# Patient Record
Sex: FEMALE | URBAN - NONMETROPOLITAN AREA
[De-identification: names, ages, dates, MRNs, and addresses within clinical notes are randomized per-mention and may not be internally consistent; named-entity substitution may affect disease eponyms.]

---

## 2021-12-30 ENCOUNTER — APPOINTMENT (RX ONLY)
Dept: URBAN - NONMETROPOLITAN AREA CLINIC 25 | Facility: CLINIC | Age: 26
Setting detail: DERMATOLOGY
End: 2021-12-30

## 2021-12-30 DIAGNOSIS — Z41.9 ENCOUNTER FOR PROCEDURE FOR PURPOSES OTHER THAN REMEDYING HEALTH STATE, UNSPECIFIED: ICD-10-CM

## 2021-12-30 PROCEDURE — ? PATIENT SPECIFIC COUNSELING

## 2021-12-30 ASSESSMENT — LOCATION DETAILED DESCRIPTION DERM: LOCATION DETAILED: LEFT INFERIOR CENTRAL MALAR CHEEK

## 2021-12-30 ASSESSMENT — LOCATION ZONE DERM: LOCATION ZONE: FACE

## 2021-12-30 ASSESSMENT — LOCATION SIMPLE DESCRIPTION DERM: LOCATION SIMPLE: LEFT CHEEK

## 2021-12-30 NOTE — PROCEDURE: PATIENT SPECIFIC COUNSELING
Discussed in detail different avenues to help with acne scarring. Recommended using mineral based sunscreen, retinol and vitamin C (topically). Recommended doing 3 sessions of microneedling for optimal results. Aware she needs to wait at least 4 weeks between sessions. Provided samples of Avene retinol, and Epionce lytic cleanser. Discussed in detail importance of keeping acne under control if she decides to get treatments.
Detail Level: Zone

## 2022-05-18 ENCOUNTER — APPOINTMENT (RX ONLY)
Dept: URBAN - NONMETROPOLITAN AREA CLINIC 25 | Facility: CLINIC | Age: 27
Setting detail: DERMATOLOGY
End: 2022-05-18

## 2022-05-18 DIAGNOSIS — Z41.9 ENCOUNTER FOR PROCEDURE FOR PURPOSES OTHER THAN REMEDYING HEALTH STATE, UNSPECIFIED: ICD-10-CM

## 2022-05-18 PROCEDURE — ? PATIENT SPECIFIC COUNSELING

## 2022-05-18 PROCEDURE — ? ECLIPSE MICROPEN

## 2022-05-18 NOTE — PROCEDURE: ECLIPSE MICROPEN
Prp Used In Ccs: 0
Location #1: Forehead
Depth In Mm: 0.1
Location #2: Crows Feet
Location #3: Cheeks
Post-Care Instructions: After the procedure, take precautions agains sun exposure. Do not apply sunscreen for 12 hours after the procedure. Do not apply make-up for 12 hours after the procedure. Avoid alcohol based toners for 10-14 days. After 2-3 days patients can return to their regular skin regimen.
Depth In Mm: 1
Location #6: Nose
Depth In Mm: 1.5
Location #4: Chin
Depth In Mm: 0.2
Depth In Mm: 0.5
Pre-Procedure Text: The treatment areas were cleansed in the usual fashion and treatment proceeded as outlined above.
Depth In Mm: 0.7
Detail Level: Zone
Location #5: Upper Lip
Length Of Topical Anesthesia Application (Optional): 30 minutes
Depth In Mm: 1.2
Topical Anesthesia Type: 23% lidocaine, 7% tetracaine
Consent: Written consent obtained, risks reviewed including but not limited to pain, scarring, infection and incomplete improvement.  Patient understands the procedure is cosmetic in nature and will require out of pocket payment.

## 2022-05-18 NOTE — PROCEDURE: PATIENT SPECIFIC COUNSELING
States she broke out after alcohol consumption. States she is very aware of her acne triggers. Otherwise only getting 1-2 breakouts per month, if that. \\nDiscussed seeing dermatologist if acne worsens or fails to improve. 3 pimples noted on forehead and one on chin. Did treatment avoiding those areas. \\nPatient will monitor. Discussed importance of acne being well controlled especially when treating acne scarring.\\n\\nRecommended mild products for  the next 5-7 days. Provided Cetaphil gentle cleanser. \\nRecommended wearing mineral based spf daily. \\nRecommended doing a series of microneedling for optimal results. \\n\\nPatient going on trip in June and has a busy summer. Will consider treatments in the fall \\n\\nTolerance today 6/10, May need additional numbing time on forehead.
Detail Level: Zone

## 2022-11-02 ENCOUNTER — APPOINTMENT (RX ONLY)
Dept: URBAN - NONMETROPOLITAN AREA CLINIC 25 | Facility: CLINIC | Age: 27
Setting detail: DERMATOLOGY
End: 2022-11-02

## 2022-11-02 DIAGNOSIS — Z41.9 ENCOUNTER FOR PROCEDURE FOR PURPOSES OTHER THAN REMEDYING HEALTH STATE, UNSPECIFIED: ICD-10-CM

## 2022-11-02 PROCEDURE — ? INVENTORY

## 2022-11-02 PROCEDURE — ? ECLIPSE MICROPEN

## 2022-11-02 PROCEDURE — ? PATIENT SPECIFIC COUNSELING

## 2022-11-02 NOTE — PROCEDURE: PATIENT SPECIFIC COUNSELING
Recommended mild products for  the next 5-7 days. Provided CeraVe gentle cleanser. \\nRecommended wearing mineral based spf. \\nRecommended returning to clinic in 4 weeks or so for next tx. Ok to come after the new year.
Detail Level: Zone